# Patient Record
Sex: FEMALE | Race: WHITE | NOT HISPANIC OR LATINO | URBAN - METROPOLITAN AREA
[De-identification: names, ages, dates, MRNs, and addresses within clinical notes are randomized per-mention and may not be internally consistent; named-entity substitution may affect disease eponyms.]

---

## 2023-04-19 ENCOUNTER — OFFICE VISIT (OUTPATIENT)
Dept: URBAN - METROPOLITAN AREA CLINIC 7 | Facility: CLINIC | Age: 78
End: 2023-04-19
Payer: MEDICARE

## 2023-04-19 ENCOUNTER — LAB OUTSIDE AN ENCOUNTER (OUTPATIENT)
Dept: URBAN - METROPOLITAN AREA CLINIC 7 | Facility: CLINIC | Age: 78
End: 2023-04-19

## 2023-04-19 ENCOUNTER — WEB ENCOUNTER (OUTPATIENT)
Dept: URBAN - METROPOLITAN AREA CLINIC 7 | Facility: CLINIC | Age: 78
End: 2023-04-19

## 2023-04-19 ENCOUNTER — TELEPHONE ENCOUNTER (OUTPATIENT)
Dept: URBAN - METROPOLITAN AREA CLINIC 7 | Facility: CLINIC | Age: 78
End: 2023-04-19

## 2023-04-19 VITALS
BODY MASS INDEX: 23.7 KG/M2 | HEIGHT: 69 IN | SYSTOLIC BLOOD PRESSURE: 98 MMHG | TEMPERATURE: 98 F | DIASTOLIC BLOOD PRESSURE: 62 MMHG | WEIGHT: 160 LBS

## 2023-04-19 DIAGNOSIS — K22.70 BARRETT'S ESOPHAGUS WITHOUT DYSPLASIA: ICD-10-CM

## 2023-04-19 DIAGNOSIS — Z87.11 HISTORY OF GASTRIC ULCER: ICD-10-CM

## 2023-04-19 DIAGNOSIS — K21.9 GERD WITHOUT ESOPHAGITIS: ICD-10-CM

## 2023-04-19 PROBLEM — 266435005: Status: ACTIVE | Noted: 2023-04-19

## 2023-04-19 PROBLEM — 275548000: Status: ACTIVE | Noted: 2023-04-19

## 2023-04-19 PROBLEM — 302914006: Status: ACTIVE | Noted: 2023-04-19

## 2023-04-19 PROCEDURE — 99204 OFFICE O/P NEW MOD 45 MIN: CPT | Performed by: INTERNAL MEDICINE

## 2023-04-19 RX ORDER — SUMATRIPTAN SUCCINATE 100 MG/1
TABLET ORAL
Qty: 54 TABLET | Status: ACTIVE | COMMUNITY

## 2023-04-19 RX ORDER — METOPROLOL TARTRATE 25 MG/1
TABLET, FILM COATED ORAL
Qty: 90 TABLET | Status: ACTIVE | COMMUNITY

## 2023-04-19 RX ORDER — CELECOXIB 100 MG/1
CAPSULE ORAL
Qty: 90 CAPSULE | Status: ON HOLD | COMMUNITY

## 2023-04-19 RX ORDER — PANTOPRAZOLE 40 MG/1
TABLET, DELAYED RELEASE ORAL
Qty: 180 TABLET | Status: ACTIVE | COMMUNITY

## 2023-04-19 RX ORDER — ZOLPIDEM TARTRATE 10 MG/1
TABLET ORAL
Qty: 90 TABLET | Status: ACTIVE | COMMUNITY

## 2023-04-19 NOTE — HPI-TODAY'S VISIT:
Longstanding hx of GERD and Barretts esophagus. Complaint on pantoprazole 40mg daily.  Recurrent intermittent dysphagia to solids. Symptoms are chronic but recently worse. Recent likely food bolus impaction that cleared spontaneously . No difficulty with liquids. Obtains relief with repeated swallowing,taking of liquids and at times inducing regurgitation of the swallowed food material. No associated pulmonary complaints and no current symptoms of oropharyngeal dysphagia. No fevers or chills. No bleeding. No night sweats. Has longstanding hx of GERD but no current exacerbation of GERD symptoms. Hx perforated PUD for emergent surgery 2015. Likely secondary to nsaids and H pylori. Still takes prn celebrex Was rxd for h pylori.

## 2023-05-15 ENCOUNTER — CLAIMS CREATED FROM THE CLAIM WINDOW (OUTPATIENT)
Dept: URBAN - METROPOLITAN AREA SURGERY CENTER 5 | Facility: SURGERY CENTER | Age: 78
End: 2023-05-15
Payer: MEDICARE

## 2023-05-15 ENCOUNTER — CLAIMS CREATED FROM THE CLAIM WINDOW (OUTPATIENT)
Dept: URBAN - METROPOLITAN AREA CLINIC 4 | Facility: CLINIC | Age: 78
End: 2023-05-15
Payer: MEDICARE

## 2023-05-15 DIAGNOSIS — K44.9 DIAPHRAGMATIC HERNIA: ICD-10-CM

## 2023-05-15 DIAGNOSIS — R13.10 ABNORMAL DEGLUTITION: ICD-10-CM

## 2023-05-15 DIAGNOSIS — K29.50 CHRONIC GASTRITIS WITHOUT BLEEDING, UNSPECIFIED GASTRITIS TYPE: ICD-10-CM

## 2023-05-15 DIAGNOSIS — K31.89 OTHER DISEASES OF STOMACH AND DUODENUM: ICD-10-CM

## 2023-05-15 DIAGNOSIS — K29.70 GASTRITIS, UNSPECIFIED, WITHOUT BLEEDING: ICD-10-CM

## 2023-05-15 PROCEDURE — 43248 EGD GUIDE WIRE INSERTION: CPT | Performed by: INTERNAL MEDICINE

## 2023-05-15 PROCEDURE — 88312 SPECIAL STAINS GROUP 1: CPT | Performed by: PATHOLOGY

## 2023-05-15 PROCEDURE — 43239 EGD BIOPSY SINGLE/MULTIPLE: CPT | Performed by: INTERNAL MEDICINE

## 2023-05-15 PROCEDURE — 43239 EGD BIOPSY SINGLE/MULTIPLE: CPT | Performed by: CLINIC/CENTER

## 2023-05-15 PROCEDURE — 43248 EGD GUIDE WIRE INSERTION: CPT | Performed by: CLINIC/CENTER

## 2023-05-15 PROCEDURE — 88305 TISSUE EXAM BY PATHOLOGIST: CPT | Performed by: PATHOLOGY

## 2023-06-01 PROBLEM — 8493009: Status: ACTIVE | Noted: 2023-06-01

## 2024-02-06 ENCOUNTER — LAB (OUTPATIENT)
Dept: URBAN - METROPOLITAN AREA CLINIC 7 | Facility: CLINIC | Age: 79
End: 2024-02-06

## 2024-02-06 ENCOUNTER — OV EP (OUTPATIENT)
Dept: URBAN - METROPOLITAN AREA CLINIC 7 | Facility: CLINIC | Age: 79
End: 2024-02-06
Payer: MEDICARE

## 2024-02-06 VITALS
SYSTOLIC BLOOD PRESSURE: 118 MMHG | HEIGHT: 69 IN | TEMPERATURE: 97.2 F | WEIGHT: 154 LBS | DIASTOLIC BLOOD PRESSURE: 62 MMHG | BODY MASS INDEX: 22.81 KG/M2

## 2024-02-06 DIAGNOSIS — Z87.11 HISTORY OF GASTRIC ULCER: ICD-10-CM

## 2024-02-06 DIAGNOSIS — K22.70 BARRETT'S ESOPHAGUS WITHOUT DYSPLASIA: ICD-10-CM

## 2024-02-06 DIAGNOSIS — R13.19 ESOPHAGEAL DYSPHAGIA: ICD-10-CM

## 2024-02-06 DIAGNOSIS — K21.9 GERD WITHOUT ESOPHAGITIS: ICD-10-CM

## 2024-02-06 DIAGNOSIS — I44.7 LBBB (LEFT BUNDLE BRANCH BLOCK): ICD-10-CM

## 2024-02-06 DIAGNOSIS — R07.89 ATYPICAL CHEST PAIN: ICD-10-CM

## 2024-02-06 PROBLEM — 102589003: Status: ACTIVE | Noted: 2024-02-06

## 2024-02-06 PROBLEM — 63467002: Status: ACTIVE | Noted: 2024-02-06

## 2024-02-06 PROBLEM — 40890009: Status: ACTIVE | Noted: 2024-02-06

## 2024-02-06 PROCEDURE — 99214 OFFICE O/P EST MOD 30 MIN: CPT | Performed by: INTERNAL MEDICINE

## 2024-02-06 RX ORDER — PANTOPRAZOLE 40 MG/1
TABLET, DELAYED RELEASE ORAL
Qty: 180 TABLET | Status: ACTIVE | COMMUNITY

## 2024-02-06 RX ORDER — ZOLPIDEM TARTRATE 10 MG/1
TABLET ORAL
Qty: 90 TABLET | Status: ACTIVE | COMMUNITY

## 2024-02-06 RX ORDER — SUMATRIPTAN SUCCINATE 100 MG/1
TABLET ORAL
Qty: 54 TABLET | Status: ACTIVE | COMMUNITY

## 2024-02-06 RX ORDER — CELECOXIB 100 MG/1
CAPSULE ORAL
Qty: 90 CAPSULE | Status: ON HOLD | COMMUNITY

## 2024-02-06 RX ORDER — METOPROLOL TARTRATE 25 MG/1
TABLET, FILM COATED ORAL
Qty: 90 TABLET | Status: ACTIVE | COMMUNITY

## 2024-02-06 NOTE — HPI-TODAY'S VISIT:
Over last weeks has had Recurrent atypical chest pain sxs with negative cardiopulmonary eval incl negative nuclear stress test and echo. Does have hx of LBBB. Had hip replacement august 2023.Longstanding hx of GERD and Barretts esophagus. Last egd negative for intestinal metaplasia. Complaint on pantoprazole 40mg daily.  again having recurrent intermittent dysphagia to solids. Symptoms are chronic but recently worse. Associated with worse GERD and erutucation. Recent likely food bolus impaction that cleared spontaneously . No difficulty with liquids. Obtains relief with repeated swallowing,taking of liquids and at times inducing regurgitation of the swallowed food material. No associated pulmonary complaints and no current symptoms of oropharyngeal dysphagia. No fevers or chills. No bleeding. No night sweats. Has longstanding hx of GERD but no current exacerbation of GERD symptoms. Hx perforated PUD for emergent surgery 2015. Likely secondary to nsaids and H pylori. Still takes prn celebrex Was rxd for h pylori.

## 2024-04-11 ENCOUNTER — LAB (OUTPATIENT)
Dept: URBAN - METROPOLITAN AREA CLINIC 4 | Facility: CLINIC | Age: 79
End: 2024-04-11
Payer: MEDICARE

## 2024-04-11 ENCOUNTER — EGD W/ DIL (OUTPATIENT)
Dept: URBAN - METROPOLITAN AREA SURGERY CENTER 5 | Facility: SURGERY CENTER | Age: 79
End: 2024-04-11
Payer: MEDICARE

## 2024-04-11 DIAGNOSIS — K20.90 ESOPHAGITIS, UNSPECIFIED WITHOUT BLEEDING: ICD-10-CM

## 2024-04-11 DIAGNOSIS — K22.89 OTHER SPECIFIED DISEASE OF ESOPHAGUS: ICD-10-CM

## 2024-04-11 DIAGNOSIS — K44.9 DIAPHRAGMATIC HERNIA WITHOUT OBSTRUCTION OR GANGRENE: ICD-10-CM

## 2024-04-11 DIAGNOSIS — K21.9 GASTRO-ESOPHAGEAL REFLUX DISEASE WITHOUT ESOPHAGITIS: ICD-10-CM

## 2024-04-11 PROCEDURE — 88312 SPECIAL STAINS GROUP 1: CPT | Performed by: PATHOLOGY

## 2024-04-11 PROCEDURE — 43248 EGD GUIDE WIRE INSERTION: CPT | Performed by: INTERNAL MEDICINE

## 2024-04-11 PROCEDURE — 88305 TISSUE EXAM BY PATHOLOGIST: CPT | Performed by: PATHOLOGY
